# Patient Record
Sex: FEMALE | Race: WHITE | URBAN - METROPOLITAN AREA
[De-identification: names, ages, dates, MRNs, and addresses within clinical notes are randomized per-mention and may not be internally consistent; named-entity substitution may affect disease eponyms.]

---

## 2022-02-27 ENCOUNTER — EMERGENCY (EMERGENCY)
Facility: HOSPITAL | Age: 42
LOS: 0 days | Discharge: HOME | End: 2022-02-27
Attending: EMERGENCY MEDICINE
Payer: COMMERCIAL

## 2022-02-27 VITALS
DIASTOLIC BLOOD PRESSURE: 75 MMHG | RESPIRATION RATE: 16 BRPM | TEMPERATURE: 98 F | SYSTOLIC BLOOD PRESSURE: 117 MMHG | OXYGEN SATURATION: 100 % | HEART RATE: 62 BPM

## 2022-02-27 VITALS
OXYGEN SATURATION: 100 % | SYSTOLIC BLOOD PRESSURE: 129 MMHG | DIASTOLIC BLOOD PRESSURE: 79 MMHG | RESPIRATION RATE: 16 BRPM | WEIGHT: 141.98 LBS | TEMPERATURE: 98 F | HEART RATE: 69 BPM | HEIGHT: 66 IN

## 2022-02-27 DIAGNOSIS — S09.90XA UNSPECIFIED INJURY OF HEAD, INITIAL ENCOUNTER: ICD-10-CM
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
## 2022-02-27 DIAGNOSIS — S06.0X0A CONCUSSION WITHOUT LOSS OF CONSCIOUSNESS, INITIAL ENCOUNTER: ICD-10-CM
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
## 2022-02-27 PROCEDURE — 82962 GLUCOSE BLOOD TEST: CPT

## 2022-02-27 PROCEDURE — 70450 CT HEAD/BRAIN W/O DYE: CPT | Mod: 26,MA

## 2022-02-27 PROCEDURE — 70450 CT HEAD/BRAIN W/O DYE: CPT | Mod: MA

## 2022-02-27 PROCEDURE — 99284 EMERGENCY DEPT VISIT MOD MDM: CPT

## 2022-02-27 PROCEDURE — 99284 EMERGENCY DEPT VISIT MOD MDM: CPT | Mod: 25

## 2022-02-27 NOTE — ED ADULT TRIAGE NOTE - ACCOMPANIED BY
EMT/paramedic Labs and CAT as above. Pt does not recall prior hx of neurologic infxn, has never had HA's before  At this time pt reports she feels completely resolved, no pain or nausea at this time. While we offered MRI pt does not want to wait in ED, would prefer DC with neurology fu and MRI as otpt  Understands return precautions

## 2022-02-27 NOTE — ED STATDOCS - ENMT, MLM
head: + contusion to left posterior scalp.  dry blood to left ear, no laceration. Nasal mucosa clear.  Mouth with normal mucosa  Throat has no vesicles, no oropharyngeal exudates and uvula is midline.

## 2022-02-27 NOTE — ED STATDOCS - PATIENT PORTAL LINK FT
You can access the FollowMyHealth Patient Portal offered by [facility] by registering at the following website: [email or web address]. By joining [facility]’s FollowMyHealth portal, you will also be able to view your health information using other applications (apps) compatible with our system.

## 2022-02-27 NOTE — ED STATDOCS - ATTENDING CONTRIBUTION TO CARE
I, Erik Cheung, performed the initial face to face bedside interview with this patient regarding history of present illness, review of symptoms and relevant past medical, social and family history.  I completed an independent physical examination.  I was the initial provider who evaluated this patient. I have signed out the follow up of any pending tests (i.e. labs, radiological studies) to the ACP.  I have communicated the patient’s plan of care and disposition with the ACP.  The history, relevant review of systems, past medical and surgical history, medical decision making, and physical examination was documented by the scribe in my presence and I attest to the accuracy of the documentation.
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
## 2022-02-27 NOTE — ED STATDOCS - NSFOLLOWUPINSTRUCTIONS_ED_ALL_ED_FT
FOLLOW UP WITH YOUR DOCTOR OR THE CONCUSSION CLINIC THIS WEEK. CALL THE OFFICE TO MAKE AN APPOINTMENT. RETURN TO ER FOR ANY WORSENING SYMPTOMS OR NEW CONCERNS.     Concussion    WHAT YOU NEED TO KNOW:    A concussion is a mild brain injury. It is usually caused by a bump or blow to the head from a fall, a motor vehicle crash, or a sports injury. Sometimes being shaken forcefully may cause a concussion.    DISCHARGE INSTRUCTIONS:    Have someone call 911 for any of the following:     Someone tries to wake you and cannot do so.      You have a seizure, increasing confusion, or a change in personality.      Your speech becomes slurred, or you have new vision problems.    Return to the emergency department if:     You have sudden and new vision problems.      You have a severe headache that does not go away.      You have arm or leg weakness, numbness, or new problems with coordination.      You have blood or clear fluid coming out of the ears or nose.    Contact your healthcare provider if:     You have nausea or are vomiting.      You feel more sleepy than usual.      Your symptoms get worse.      Your symptoms last longer than 6 weeks after the injury.      You have questions or concerns about your condition or care.    Medicines: You may need any of the following:     Acetaminophen decreases pain and fever. It is available without a doctor's order. Ask how much to take and how often to take it. Follow directions. Read the labels of all other medicines you are using to see if they also contain acetaminophen, or ask your doctor or pharmacist. Acetaminophen can cause liver damage if not taken correctly. Do not use more than 4 grams (4,000 milligrams) total of acetaminophen in one day.       NSAIDs help decrease swelling and pain or fever. This medicine is available with or without a doctor's order. NSAIDs can cause stomach bleeding or kidney problems in certain people. If you take blood thinner medicine, always ask your healthcare provider if NSAIDs are safe for you. Always read the medicine label and follow directions.      Take your medicine as directed. Contact your healthcare provider if you think your medicine is not helping or if you have side effects. Tell him or her if you are allergic to any medicine. Keep a list of the medicines, vitamins, and herbs you take. Include the amounts, and when and why you take them. Bring the list or the pill bottles to follow-up visits. Carry your medicine list with you in case of an emergency.    Self-care: Concussion symptoms usually go away within about 10 days, but they may last longer. The following may be recommended to manage your symptoms:     Rest from physical and mental activities as directed. Mental activities are those that require thinking, concentration, and attention. You will need to rest until your symptoms are gone. Rest will allow you to recover from your concussion. Ask your healthcare provider when you can return to work and other daily activities.      Have someone stay with you for the first 24 hours after your injury. Your healthcare provider should be contacted if your symptoms get worse, or you develop new symptoms.      Do not participate in sports and physical activities until your healthcare provider says it is okay. They could make your symptoms worse or lead to another concussion. Your healthcare provider will tell you when it is okay for you to return to sports or physical activities. Ask for more information about sports concussions.    Prevent another concussion:     Wear protective sports equipment that fits properly. Helmets help decrease your risk for a serious brain injury. Talk to your healthcare provider about ways you can decrease your risk for a concussion if you play sports.      Wear your seatbelt every time you travel. This helps to decrease your risk for a head injury if you are in a car accident.     Follow up with your healthcare provider as directed: Write down your questions so you remember to ask them during your visits.     FOLLOW UP EVALUATION  To ensure optimal concussion recovery, follow up with a doctor specialized in concussion management. An evaluation by a specialty concussion program can ensure timely return to activities.    We offer appointments through the [facility]’s Hotline at [phone].
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
